# Patient Record
Sex: FEMALE | Race: WHITE | NOT HISPANIC OR LATINO | Employment: UNEMPLOYED | URBAN - NONMETROPOLITAN AREA
[De-identification: names, ages, dates, MRNs, and addresses within clinical notes are randomized per-mention and may not be internally consistent; named-entity substitution may affect disease eponyms.]

---

## 2023-02-04 PROBLEM — B00.1 RECURRENT COLD SORES: Status: ACTIVE | Noted: 2023-02-04

## 2023-02-04 PROBLEM — E66.9 OBESITY: Status: ACTIVE | Noted: 2023-02-04

## 2023-02-04 PROBLEM — F41.9 ANXIETY: Status: ACTIVE | Noted: 2023-02-04

## 2023-02-04 PROBLEM — K59.00 CONSTIPATION: Status: ACTIVE | Noted: 2023-02-04

## 2023-02-04 PROBLEM — D64.9 ANEMIA: Status: ACTIVE | Noted: 2023-02-04

## 2023-02-04 PROBLEM — N92.6 IRREGULAR PERIODS: Status: ACTIVE | Noted: 2023-02-04

## 2023-02-04 PROBLEM — R21 RASH: Status: ACTIVE | Noted: 2023-02-04

## 2023-02-04 PROBLEM — T78.40XA ALLERGIC REACTION: Status: ACTIVE | Noted: 2023-02-04

## 2023-02-04 PROBLEM — B37.9 YEAST INFECTION: Status: ACTIVE | Noted: 2023-02-04

## 2023-02-04 PROBLEM — G43.909 MIGRAINE: Status: ACTIVE | Noted: 2023-02-04

## 2023-02-04 PROBLEM — K64.5 HEMORRHOIDS, THROMBOSED: Status: ACTIVE | Noted: 2023-02-04

## 2023-02-04 PROBLEM — E53.8 B12 DEFICIENCY: Status: ACTIVE | Noted: 2023-02-04

## 2023-02-04 PROBLEM — B00.1 COLD SORE: Status: ACTIVE | Noted: 2023-02-04

## 2023-02-04 RX ORDER — FLUCONAZOLE 150 MG/1
150 TABLET ORAL
COMMUNITY
End: 2023-03-30 | Stop reason: ALTCHOICE

## 2023-02-04 RX ORDER — RIZATRIPTAN BENZOATE 10 MG/1
1 TABLET ORAL
COMMUNITY
End: 2023-05-02 | Stop reason: SDUPTHER

## 2023-02-04 RX ORDER — VALACYCLOVIR HYDROCHLORIDE 1 G/1
1 TABLET, FILM COATED ORAL DAILY
COMMUNITY
End: 2023-03-30 | Stop reason: ALTCHOICE

## 2023-02-04 RX ORDER — NORELGESTROMIN AND ETHINYL ESTRADIOL 35; 150 UG/D; UG/D
1 PATCH TRANSDERMAL
COMMUNITY
End: 2023-03-30 | Stop reason: ALTCHOICE

## 2023-02-04 RX ORDER — HYDROXYZINE HYDROCHLORIDE 25 MG/1
1 TABLET, FILM COATED ORAL AS NEEDED
COMMUNITY
End: 2023-06-06 | Stop reason: SDUPTHER

## 2023-02-04 RX ORDER — VENLAFAXINE HYDROCHLORIDE 75 MG/1
1 CAPSULE, EXTENDED RELEASE ORAL
COMMUNITY
End: 2023-06-06 | Stop reason: SDUPTHER

## 2023-02-04 RX ORDER — MULTIVITAMIN
TABLET ORAL
COMMUNITY

## 2023-02-04 RX ORDER — BUPROPION HYDROCHLORIDE 150 MG/1
1 TABLET ORAL
COMMUNITY
End: 2023-03-30 | Stop reason: ALTCHOICE

## 2023-02-04 RX ORDER — TOPIRAMATE 100 MG/1
1 TABLET, FILM COATED ORAL 2 TIMES DAILY
COMMUNITY
End: 2023-06-06 | Stop reason: SDUPTHER

## 2023-03-30 ENCOUNTER — OFFICE VISIT (OUTPATIENT)
Dept: PRIMARY CARE | Facility: CLINIC | Age: 33
End: 2023-03-30
Payer: COMMERCIAL

## 2023-03-30 VITALS
DIASTOLIC BLOOD PRESSURE: 78 MMHG | SYSTOLIC BLOOD PRESSURE: 123 MMHG | BODY MASS INDEX: 28.45 KG/M2 | HEIGHT: 66 IN | HEART RATE: 85 BPM | WEIGHT: 177 LBS

## 2023-03-30 DIAGNOSIS — E53.8 B12 DEFICIENCY: ICD-10-CM

## 2023-03-30 DIAGNOSIS — M25.559 HIP PAIN: ICD-10-CM

## 2023-03-30 DIAGNOSIS — D50.0 IRON DEFICIENCY ANEMIA DUE TO CHRONIC BLOOD LOSS: ICD-10-CM

## 2023-03-30 LAB
AMPHETAMINE (PRESENCE) IN URINE BY SCREEN METHOD: NORMAL
BARBITURATES PRESENCE IN URINE BY SCREEN METHOD: NORMAL
BENZODIAZEPINE (PRESENCE) IN URINE BY SCREEN METHOD: NORMAL
CANNABINOIDS IN URINE BY SCREEN METHOD: NORMAL
COCAINE (PRESENCE) IN URINE BY SCREEN METHOD: NORMAL
DRUG SCREEN COMMENT URINE: NORMAL
FENTANYL URINE: NORMAL
METHADONE (PRESENCE) IN URINE BY SCREEN METHOD: NORMAL
OPIATES (PRESENCE) IN URINE BY SCREEN METHOD: NORMAL
OXYCODONE (PRESENCE) IN URINE BY SCREEN METHOD: NORMAL
PHENCYCLIDINE (PRESENCE) IN URINE BY SCREEN METHOD: NORMAL

## 2023-03-30 PROCEDURE — 3008F BODY MASS INDEX DOCD: CPT | Performed by: INTERNAL MEDICINE

## 2023-03-30 PROCEDURE — 80307 DRUG TEST PRSMV CHEM ANLYZR: CPT

## 2023-03-30 PROCEDURE — 99214 OFFICE O/P EST MOD 30 MIN: CPT | Performed by: INTERNAL MEDICINE

## 2023-03-30 RX ORDER — PHENTERMINE HYDROCHLORIDE 37.5 MG/1
37.5 TABLET ORAL
Qty: 30 TABLET | Refills: 0 | Status: SHIPPED | OUTPATIENT
Start: 2023-03-30 | End: 2023-05-02 | Stop reason: SDUPTHER

## 2023-03-30 ASSESSMENT — ENCOUNTER SYMPTOMS
CHILLS: 0
HIP PAIN: 1
ABDOMINAL DISTENTION: 0
FATIGUE: 0
SHORTNESS OF BREATH: 0
COUGH: 0
SINUS PAIN: 0
ARTHRALGIAS: 1
WEAKNESS: 0
DIARRHEA: 0
SORE THROAT: 0
WHEEZING: 0
SINUS PRESSURE: 0
APPETITE CHANGE: 0
NAUSEA: 0
VOMITING: 0
ACTIVITY CHANGE: 0
BACK PAIN: 0
NUMBNESS: 0

## 2023-03-30 ASSESSMENT — PATIENT HEALTH QUESTIONNAIRE - PHQ9
SUM OF ALL RESPONSES TO PHQ9 QUESTIONS 1 AND 2: 0
2. FEELING DOWN, DEPRESSED OR HOPELESS: NOT AT ALL
1. LITTLE INTEREST OR PLEASURE IN DOING THINGS: NOT AT ALL

## 2023-03-30 NOTE — ASSESSMENT & PLAN NOTE
- pt has had good success with adipex in the past and has done well to mainatain her weight loss   - currently bmi 28, weight 177 lbs  - agreeable to do another 3 mo  - I have personally reviewed this patient's OARRS report and found it to be appropriate. This has lance uploaded into the medical record. I have considered the risks of abuse, addiction, dependency and diversion and feel that it is clinically appropriate for this patient to be prescribed this controlled substance medication.   - uds ordered

## 2023-03-30 NOTE — PROGRESS NOTES
"Subjective   Patient ID: Sophie Melvin is a 32 y.o. female who presents for Follow-up (6 month) and Hip Pain (Severe pain/She states its been going on for years/She did not want to bring it up since she is not in the pain all the time).  Hip Pain   Pertinent negatives include no numbness.       Patient is here today for 6 mo follow up   Patient reports that she got  since her last appt, everything went well except she was not happy with the caterer.     She has been having hip pain.   She states it has been going on for years.  She has radiating pain from her right buttock radiating down her leg. No numbness or tingling, has to pick her leg up to cross her legs. Cannot sit in the car for more than 30 minutes or she keeps having to readjusting. No injury.     Review of Systems   Constitutional:  Negative for activity change, appetite change, chills and fatigue.   HENT:  Negative for congestion, postnasal drip, sinus pressure, sinus pain and sore throat.    Respiratory:  Negative for cough, shortness of breath and wheezing.    Cardiovascular:  Negative for chest pain and leg swelling.   Gastrointestinal:  Negative for abdominal distention, diarrhea, nausea and vomiting.   Musculoskeletal:  Positive for arthralgias. Negative for back pain.   Neurological:  Negative for weakness and numbness.       Objective   /78 (BP Location: Right arm, Patient Position: Sitting, BP Cuff Size: Adult)   Pulse 85   Ht 1.676 m (5' 6\")   Wt 80.3 kg (177 lb)   BMI 28.57 kg/m²     Physical Exam  Constitutional:       General: She is not in acute distress.     Appearance: Normal appearance.   HENT:      Head: Normocephalic and atraumatic.      Nose: Nose normal.   Eyes:      Extraocular Movements: Extraocular movements intact.      Pupils: Pupils are equal, round, and reactive to light.   Cardiovascular:      Rate and Rhythm: Normal rate and regular rhythm.      Pulses: Normal pulses.      Heart sounds: No murmur " heard.  Pulmonary:      Effort: Pulmonary effort is normal. No respiratory distress.      Breath sounds: Normal breath sounds.   Musculoskeletal:         General: Tenderness present. No swelling.      Comments: Pain with abduction of the right hip in the groin, pain with flexion of the hip   Neurological:      Mental Status: She is alert.       OARRS:  Taylor CARLOS DO Prashanth on 3/30/2023  1:37 PM  I have personally reviewed the OARRS report for Sophie Melvin. I have considered the risks of abuse, dependence, addiction and diversion and I believe that it is clinically appropriate for Sophie Melvin to be prescribed this medication    Is the patient prescribed a combination of a benzodiazepine and opioid?  No    Last Urine Drug Screen / ordered today: YEs  Recent Results (from the past 75942 hour(s))   OPIATE/OPIOID/BENZO PRESCRIPTION COMPLIANCE    Collection Time: 03/02/22  2:20 PM   Result Value Ref Range    DRUG SCREEN COMMENT URINE SEE BELOW     Creatine, Urine 214.9 mg/dL    Amphetamine Screen, Urine PRESUMPTIVE POSITIVE (A) NEGATIVE    Barbiturate Screen, Urine PRESUMPTIVE NEGATIVE NEGATIVE    Cannabinoid Screen, Urine PRESUMPTIVE NEGATIVE NEGATIVE    Cocaine Screen, Urine PRESUMPTIVE NEGATIVE NEGATIVE    PCP Screen, Urine PRESUMPTIVE NEGATIVE NEGATIVE    7-Aminoclonazepam <25 Cutoff <25 ng/mL    Alpha-Hydroxyalprazolam <25 Cutoff <25 ng/mL    Alpha-Hydroxymidazolam <25 Cutoff <25 ng/mL    Alprazolam <25 Cutoff <25 ng/mL    Chlordiazepoxide <25 Cutoff <25 ng/mL    Clonazepam <25 Cutoff <25 ng/mL    Diazepam <25 Cutoff <25 ng/mL    Lorazepam <25 Cutoff <25 ng/mL    Midazolam <25 Cutoff <25 ng/mL    Nordiazepam <25 Cutoff <25 ng/mL    Oxazepam <25 Cutoff <25 ng/mL    Temazepam <25 Cutoff <25 ng/mL    Zolpidem <25 Cutoff <25 ng/mL    Zolpidem Metabolite (ZCA) <25 Cutoff <25 ng/mL    6-Acetylmorphine <25 Cutoff <25 ng/mL    Codeine <50 Cutoff <50 ng/mL    Hydrocodone <25 Cutoff <25 ng/mL    Hydromorphone <25  Cutoff <25 ng/mL    Morphine Urine <50 Cutoff <50 ng/mL    Norhydrocodone <25 Cutoff <25 ng/mL    Noroxycodone <25 Cutoff <25 ng/mL    Oxycodone <25 Cutoff <25 ng/mL    Oxymorphone <25 Cutoff <25 ng/mL    Tramadol <50 Cutoff <50 ng/mL    O-Desmethyltramadol <50 Cutoff <50 ng/mL    Fentanyl <2.5 Cutoff<2.5 ng/mL    Norfentanyl <2.5 Cutoff<2.5 ng/mL    METHADONE CONFIRMATION,URINE <25 Cutoff <25 ng/mL    EDDP <25 Cutoff <25 ng/mL   Amphetamine Confirm, Urine    Collection Time: 03/02/22  2:20 PM   Result Value Ref Range    Amphetamines,Urine <50 ng/mL    MDA, Urine <200 ng/mL    MDEA, Urine <200 ng/mL    MDMA, Urine <200 ng/mL    Methamphetamine Quant, Ur <200 ng/mL    Phentermine,Urine >5000 ng/mL     N/A    Controlled Substance Agreement:  Date of the Last Agreement: 03/30/2023  Reviewed Controlled Substance Agreement including but not limited to the benefits, risks, and alternatives to treatment with a Controlled Substance medication(s).    Anorexiants:   What is the patient's goal of therapy? Weight loss   Is this being achieved with current treatment? yes    I have assessed the patient's continuing efforts to lose weight., I have assessed the patient's dedication to the treatment program and the response to treatment., and I have assessed the presence or absence of contraindications, adverse effects, and indicators of possible substance abuse that would necessitate cessation of treatment utilizing controlled substance.    Patient has demonstrated continued efforts to lose weight, is dedicated to the treatment program and the response to treatment. and I have assessed for the presence or absence of contraindications, adverse effects, and indicators of possible substance abuse that would necessitate cessation of treatment utilizing controlled substance.    Activities of Daily Living:   Is your overall impression that this patient is benefiting (symptom reduction outweighs side effects) from anorexiants therapy? Yes      1. Physical Functioning: Same  2. Family Relationship: Same  3. Social Relationship: Same  4. Mood: Same  5. Sleep Patterns: Same  6. Overall Function: Same      Assessment/Plan   Problem List Items Addressed This Visit          Musculoskeletal    Hip pain    Relevant Orders    XR hip right 2 or 3 views    XR lumbar spine 2-3 views    Referral to Physical Therapy       Endocrine/Metabolic    B12 deficiency    Relevant Orders    Vitamin B12    Body mass index (BMI) of 28.0 to 28.9 in adult - Primary     - pt has had good success with adipex in the past and has done well to mainatain her weight loss   - currently bmi 28, weight 177 lbs  - agreeable to do another 3 mo  - I have personally reviewed this patient's OARRS report and found it to be appropriate. This has lance uploaded into the medical record. I have considered the risks of abuse, addiction, dependency and diversion and feel that it is clinically appropriate for this patient to be prescribed this controlled substance medication.   - will have her do UDS at next appt          Relevant Medications    phentermine (Adipex-P) 37.5 mg tablet    Other Relevant Orders    Drug Screen, Urine With Reflex to Confirmation       Hematologic    Anemia    Relevant Orders    CBC and Auto Differential    Iron and TIBC    Ferritin       Final diagnoses:   [Z68.28] Body mass index (BMI) of 28.0 to 28.9 in adult   [E53.8] B12 deficiency   [D50.0] Iron deficiency anemia due to chronic blood loss   [M25.559] Hip pain

## 2023-04-28 ENCOUNTER — LAB (OUTPATIENT)
Dept: LAB | Facility: LAB | Age: 33
End: 2023-04-28
Payer: COMMERCIAL

## 2023-04-28 DIAGNOSIS — D50.0 IRON DEFICIENCY ANEMIA DUE TO CHRONIC BLOOD LOSS: ICD-10-CM

## 2023-04-28 DIAGNOSIS — E53.8 B12 DEFICIENCY: ICD-10-CM

## 2023-04-28 LAB
BASOPHILS (10*3/UL) IN BLOOD BY AUTOMATED COUNT: 0.06 X10E9/L (ref 0–0.1)
BASOPHILS/100 LEUKOCYTES IN BLOOD BY AUTOMATED COUNT: 0.8 % (ref 0–2)
COBALAMIN (VITAMIN B12) (PG/ML) IN SER/PLAS: 253 PG/ML (ref 211–911)
EOSINOPHILS (10*3/UL) IN BLOOD BY AUTOMATED COUNT: 0.05 X10E9/L (ref 0–0.7)
EOSINOPHILS/100 LEUKOCYTES IN BLOOD BY AUTOMATED COUNT: 0.7 % (ref 0–6)
ERYTHROCYTE DISTRIBUTION WIDTH (RATIO) BY AUTOMATED COUNT: 12.4 % (ref 11.5–14.5)
ERYTHROCYTE MEAN CORPUSCULAR HEMOGLOBIN CONCENTRATION (G/DL) BY AUTOMATED: 32 G/DL (ref 32–36)
ERYTHROCYTE MEAN CORPUSCULAR VOLUME (FL) BY AUTOMATED COUNT: 85 FL (ref 80–100)
ERYTHROCYTES (10*6/UL) IN BLOOD BY AUTOMATED COUNT: 5.41 X10E12/L (ref 4–5.2)
FERRITIN (UG/LL) IN SER/PLAS: 71 UG/L (ref 8–150)
HEMATOCRIT (%) IN BLOOD BY AUTOMATED COUNT: 45.9 % (ref 36–46)
HEMOGLOBIN (G/DL) IN BLOOD: 14.7 G/DL (ref 12–16)
IMMATURE GRANULOCYTES/100 LEUKOCYTES IN BLOOD BY AUTOMATED COUNT: 0.1 % (ref 0–0.9)
IRON (UG/DL) IN SER/PLAS: 111 UG/DL (ref 35–150)
IRON BINDING CAPACITY (UG/DL) IN SER/PLAS: 295 UG/DL (ref 240–445)
IRON SATURATION (%) IN SER/PLAS: 38 % (ref 25–45)
LEUKOCYTES (10*3/UL) IN BLOOD BY AUTOMATED COUNT: 7.5 X10E9/L (ref 4.4–11.3)
LYMPHOCYTES (10*3/UL) IN BLOOD BY AUTOMATED COUNT: 2.76 X10E9/L (ref 1.2–4.8)
LYMPHOCYTES/100 LEUKOCYTES IN BLOOD BY AUTOMATED COUNT: 36.9 % (ref 13–44)
MONOCYTES (10*3/UL) IN BLOOD BY AUTOMATED COUNT: 0.47 X10E9/L (ref 0.1–1)
MONOCYTES/100 LEUKOCYTES IN BLOOD BY AUTOMATED COUNT: 6.3 % (ref 2–10)
NEUTROPHILS (10*3/UL) IN BLOOD BY AUTOMATED COUNT: 4.12 X10E9/L (ref 1.2–7.7)
NEUTROPHILS/100 LEUKOCYTES IN BLOOD BY AUTOMATED COUNT: 55.2 % (ref 40–80)
PLATELETS (10*3/UL) IN BLOOD AUTOMATED COUNT: 317 X10E9/L (ref 150–450)

## 2023-04-28 PROCEDURE — 85025 COMPLETE CBC W/AUTO DIFF WBC: CPT

## 2023-04-28 PROCEDURE — 82728 ASSAY OF FERRITIN: CPT

## 2023-04-28 PROCEDURE — 83540 ASSAY OF IRON: CPT

## 2023-04-28 PROCEDURE — 83550 IRON BINDING TEST: CPT

## 2023-04-28 PROCEDURE — 82607 VITAMIN B-12: CPT

## 2023-04-28 PROCEDURE — 36415 COLL VENOUS BLD VENIPUNCTURE: CPT

## 2023-05-02 ENCOUNTER — PATIENT MESSAGE (OUTPATIENT)
Dept: PRIMARY CARE | Facility: CLINIC | Age: 33
End: 2023-05-02

## 2023-05-02 ENCOUNTER — OFFICE VISIT (OUTPATIENT)
Dept: PRIMARY CARE | Facility: CLINIC | Age: 33
End: 2023-05-02
Payer: COMMERCIAL

## 2023-05-02 VITALS
HEART RATE: 116 BPM | WEIGHT: 173 LBS | DIASTOLIC BLOOD PRESSURE: 80 MMHG | SYSTOLIC BLOOD PRESSURE: 120 MMHG | HEIGHT: 66 IN | BODY MASS INDEX: 27.8 KG/M2

## 2023-05-02 DIAGNOSIS — M25.559 HIP PAIN, UNSPECIFIED LATERALITY: ICD-10-CM

## 2023-05-02 DIAGNOSIS — G43.709 CHRONIC MIGRAINE WITHOUT AURA WITHOUT STATUS MIGRAINOSUS, NOT INTRACTABLE: Primary | ICD-10-CM

## 2023-05-02 PROCEDURE — 99213 OFFICE O/P EST LOW 20 MIN: CPT | Performed by: INTERNAL MEDICINE

## 2023-05-02 PROCEDURE — 3008F BODY MASS INDEX DOCD: CPT | Performed by: INTERNAL MEDICINE

## 2023-05-02 PROCEDURE — 1036F TOBACCO NON-USER: CPT | Performed by: INTERNAL MEDICINE

## 2023-05-02 RX ORDER — PHENTERMINE HYDROCHLORIDE 37.5 MG/1
37.5 TABLET ORAL
Qty: 30 TABLET | Refills: 0 | Status: SHIPPED | OUTPATIENT
Start: 2023-05-02 | End: 2023-06-06 | Stop reason: SDUPTHER

## 2023-05-02 RX ORDER — PHENTERMINE HYDROCHLORIDE 37.5 MG/1
37.5 TABLET ORAL
Qty: 30 TABLET | Refills: 0 | Status: SHIPPED | OUTPATIENT
Start: 2023-05-02 | End: 2023-05-02 | Stop reason: SDUPTHER

## 2023-05-02 RX ORDER — RIZATRIPTAN BENZOATE 10 MG/1
TABLET ORAL
Qty: 9 TABLET | Refills: 3 | Status: SHIPPED | OUTPATIENT
Start: 2023-05-02 | End: 2023-06-06 | Stop reason: SDUPTHER

## 2023-05-02 ASSESSMENT — PATIENT HEALTH QUESTIONNAIRE - PHQ9
1. LITTLE INTEREST OR PLEASURE IN DOING THINGS: NOT AT ALL
SUM OF ALL RESPONSES TO PHQ9 QUESTIONS 1 AND 2: 0
2. FEELING DOWN, DEPRESSED OR HOPELESS: NOT AT ALL

## 2023-05-02 ASSESSMENT — ENCOUNTER SYMPTOMS
SHORTNESS OF BREATH: 0
COUGH: 0

## 2023-05-02 NOTE — PROGRESS NOTES
"Subjective   Patient ID: Sophie Melvin is a 33 y.o. female who presents for Follow-up (1 month).  HPI  Patient is here today for 1 mo follow up on Adipex.  Pt has lost 5 lbs.    Review of Systems   Respiratory:  Negative for cough and shortness of breath.    Cardiovascular:  Negative for chest pain.       Objective   /80 (BP Location: Right arm, Patient Position: Sitting, BP Cuff Size: Adult)   Pulse (!) 116   Ht 1.676 m (5' 6\")   Wt 78.5 kg (173 lb)   BMI 27.92 kg/m²     Physical Exam  Constitutional:       Appearance: Normal appearance.   Neurological:      Mental Status: She is alert.   Psychiatric:         Mood and Affect: Mood normal.         Behavior: Behavior normal.         Thought Content: Thought content normal.         OARRS:  Taylor Alford DO on 3/30/2023  1:37 PM  I have personally reviewed the OARRS report for Sophie Melvin. I have considered the risks of abuse, dependence, addiction and diversion and I believe that it is clinically appropriate for Sophie Melvin to be prescribed this medication     Is the patient prescribed a combination of a benzodiazepine and opioid?  No     Last Urine Drug Screen / ordered today: YEs  Recent Results         Recent Results (from the past 48534 hour(s))   OPIATE/OPIOID/BENZO PRESCRIPTION COMPLIANCE     Collection Time: 03/02/22  2:20 PM   Result Value Ref Range     DRUG SCREEN COMMENT URINE SEE BELOW       Creatine, Urine 214.9 mg/dL     Amphetamine Screen, Urine PRESUMPTIVE POSITIVE (A) NEGATIVE     Barbiturate Screen, Urine PRESUMPTIVE NEGATIVE NEGATIVE     Cannabinoid Screen, Urine PRESUMPTIVE NEGATIVE NEGATIVE     Cocaine Screen, Urine PRESUMPTIVE NEGATIVE NEGATIVE     PCP Screen, Urine PRESUMPTIVE NEGATIVE NEGATIVE     7-Aminoclonazepam <25 Cutoff <25 ng/mL     Alpha-Hydroxyalprazolam <25 Cutoff <25 ng/mL     Alpha-Hydroxymidazolam <25 Cutoff <25 ng/mL     Alprazolam <25 Cutoff <25 ng/mL     Chlordiazepoxide <25 Cutoff <25 ng/mL     " Clonazepam <25 Cutoff <25 ng/mL     Diazepam <25 Cutoff <25 ng/mL     Lorazepam <25 Cutoff <25 ng/mL     Midazolam <25 Cutoff <25 ng/mL     Nordiazepam <25 Cutoff <25 ng/mL     Oxazepam <25 Cutoff <25 ng/mL     Temazepam <25 Cutoff <25 ng/mL     Zolpidem <25 Cutoff <25 ng/mL     Zolpidem Metabolite (ZCA) <25 Cutoff <25 ng/mL     6-Acetylmorphine <25 Cutoff <25 ng/mL     Codeine <50 Cutoff <50 ng/mL     Hydrocodone <25 Cutoff <25 ng/mL     Hydromorphone <25 Cutoff <25 ng/mL     Morphine Urine <50 Cutoff <50 ng/mL     Norhydrocodone <25 Cutoff <25 ng/mL     Noroxycodone <25 Cutoff <25 ng/mL     Oxycodone <25 Cutoff <25 ng/mL     Oxymorphone <25 Cutoff <25 ng/mL     Tramadol <50 Cutoff <50 ng/mL     O-Desmethyltramadol <50 Cutoff <50 ng/mL     Fentanyl <2.5 Cutoff<2.5 ng/mL     Norfentanyl <2.5 Cutoff<2.5 ng/mL     METHADONE CONFIRMATION,URINE <25 Cutoff <25 ng/mL     EDDP <25 Cutoff <25 ng/mL   Amphetamine Confirm, Urine     Collection Time: 03/02/22  2:20 PM   Result Value Ref Range     Amphetamines,Urine <50 ng/mL     MDA, Urine <200 ng/mL     MDEA, Urine <200 ng/mL     MDMA, Urine <200 ng/mL     Methamphetamine Quant, Ur <200 ng/mL     Phentermine,Urine >5000 ng/mL         N/A     Controlled Substance Agreement:  Date of the Last Agreement: 03/30/2023  Reviewed Controlled Substance Agreement including but not limited to the benefits, risks, and alternatives to treatment with a Controlled Substance medication(s).     Anorexiants:   What is the patient's goal of therapy? Weight loss   Is this being achieved with current treatment? yes     I have assessed the patient's continuing efforts to lose weight., I have assessed the patient's dedication to the treatment program and the response to treatment., and I have assessed the presence or absence of contraindications, adverse effects, and indicators of possible substance abuse that would necessitate cessation of treatment utilizing controlled substance.     Patient has  demonstrated continued efforts to lose weight, is dedicated to the treatment program and the response to treatment. and I have assessed for the presence or absence of contraindications, adverse effects, and indicators of possible substance abuse that would necessitate cessation of treatment utilizing controlled substance.     Activities of Daily Living:   Is your overall impression that this patient is benefiting (symptom reduction outweighs side effects) from anorexiants therapy? Yes      1. Physical Functioning: Same  2. Family Relationship: Same  3. Social Relationship: Same  4. Mood: Same  5. Sleep Patterns: Same  6. Overall Function: Same  Assessment/Plan   Problem List Items Addressed This Visit          Musculoskeletal    Hip pain    Relevant Orders    Referral to Physical Therapy       Endocrine/Metabolic    Body mass index (BMI) of 28.0 to 28.9 in adult    Relevant Medications    phentermine (Adipex-P) 37.5 mg tablet       Other    Migraine - Primary    Relevant Medications    rizatriptan (Maxalt) 10 mg tablet       Body mass index (BMI) of 28.0 to 28.9 in adult - Primary        - pt has had good success with adipex in the past and has done well to mainatain her weight loss   - bmi 28, weight 177 lbs down to 173  - sent month #2  - I have personally reviewed this patient's OARRS report and found it to be appropriate. This has lance uploaded into the medical record. I have considered the risks of abuse, addiction, dependency and diversion and feel that it is clinically appropriate for this patient to be prescribed this controlled substance medication.              Hip and back xray normal, recommend pt and if not improving will refer to orthopedics   Final diagnoses:   [Z68.28] Body mass index (BMI) of 28.0 to 28.9 in adult   [G43.709] Chronic migraine without aura without status migrainosus, not intractable   [M25.559] Hip pain, unspecified laterality

## 2023-06-06 ENCOUNTER — OFFICE VISIT (OUTPATIENT)
Dept: PRIMARY CARE | Facility: CLINIC | Age: 33
End: 2023-06-06
Payer: COMMERCIAL

## 2023-06-06 VITALS
HEART RATE: 109 BPM | BODY MASS INDEX: 27.48 KG/M2 | SYSTOLIC BLOOD PRESSURE: 121 MMHG | DIASTOLIC BLOOD PRESSURE: 80 MMHG | WEIGHT: 171 LBS | HEIGHT: 66 IN

## 2023-06-06 DIAGNOSIS — G43.709 CHRONIC MIGRAINE WITHOUT AURA WITHOUT STATUS MIGRAINOSUS, NOT INTRACTABLE: Primary | ICD-10-CM

## 2023-06-06 DIAGNOSIS — F41.9 ANXIETY: Primary | ICD-10-CM

## 2023-06-06 DIAGNOSIS — G43.709 CHRONIC MIGRAINE WITHOUT AURA WITHOUT STATUS MIGRAINOSUS, NOT INTRACTABLE: ICD-10-CM

## 2023-06-06 PROCEDURE — 1036F TOBACCO NON-USER: CPT | Performed by: INTERNAL MEDICINE

## 2023-06-06 PROCEDURE — 80368 SEDATIVE HYPNOTICS: CPT

## 2023-06-06 PROCEDURE — 99213 OFFICE O/P EST LOW 20 MIN: CPT | Performed by: INTERNAL MEDICINE

## 2023-06-06 PROCEDURE — 80365 DRUG SCREENING OXYCODONE: CPT

## 2023-06-06 PROCEDURE — 80346 BENZODIAZEPINES1-12: CPT

## 2023-06-06 PROCEDURE — 80373 DRUG SCREENING TRAMADOL: CPT

## 2023-06-06 PROCEDURE — 3008F BODY MASS INDEX DOCD: CPT | Performed by: INTERNAL MEDICINE

## 2023-06-06 PROCEDURE — 80358 DRUG SCREENING METHADONE: CPT

## 2023-06-06 PROCEDURE — 80307 DRUG TEST PRSMV CHEM ANLYZR: CPT

## 2023-06-06 PROCEDURE — 80354 DRUG SCREENING FENTANYL: CPT

## 2023-06-06 PROCEDURE — 80324 DRUG SCREEN AMPHETAMINES 1/2: CPT

## 2023-06-06 PROCEDURE — 80361 OPIATES 1 OR MORE: CPT

## 2023-06-06 RX ORDER — TOPIRAMATE 100 MG/1
100 TABLET, FILM COATED ORAL 2 TIMES DAILY
Qty: 180 TABLET | Refills: 3 | Status: SHIPPED | OUTPATIENT
Start: 2023-06-06 | End: 2024-06-04

## 2023-06-06 RX ORDER — TOPIRAMATE 100 MG/1
100 TABLET, FILM COATED ORAL 2 TIMES DAILY
Qty: 180 TABLET | Refills: 3 | Status: SHIPPED | OUTPATIENT
Start: 2023-06-06 | End: 2023-06-06 | Stop reason: SDUPTHER

## 2023-06-06 RX ORDER — HYDROXYZINE HYDROCHLORIDE 25 MG/1
25 TABLET, FILM COATED ORAL AS NEEDED
Qty: 90 TABLET | Refills: 3 | Status: SHIPPED | OUTPATIENT
Start: 2023-06-06

## 2023-06-06 RX ORDER — PHENTERMINE HYDROCHLORIDE 37.5 MG/1
37.5 TABLET ORAL
Qty: 30 TABLET | Refills: 0 | Status: SHIPPED | OUTPATIENT
Start: 2023-06-06 | End: 2023-06-06 | Stop reason: SDUPTHER

## 2023-06-06 RX ORDER — PHENTERMINE HYDROCHLORIDE 37.5 MG/1
37.5 TABLET ORAL
Qty: 30 TABLET | Refills: 0 | Status: SHIPPED | OUTPATIENT
Start: 2023-06-06 | End: 2023-07-06

## 2023-06-06 RX ORDER — VENLAFAXINE HYDROCHLORIDE 75 MG/1
75 CAPSULE, EXTENDED RELEASE ORAL
Qty: 90 CAPSULE | Refills: 3 | Status: SHIPPED | OUTPATIENT
Start: 2023-06-06 | End: 2024-06-04

## 2023-06-06 RX ORDER — VENLAFAXINE HYDROCHLORIDE 75 MG/1
75 CAPSULE, EXTENDED RELEASE ORAL
Qty: 90 CAPSULE | Refills: 3 | Status: SHIPPED | OUTPATIENT
Start: 2023-06-06 | End: 2023-06-06 | Stop reason: SDUPTHER

## 2023-06-06 RX ORDER — RIZATRIPTAN BENZOATE 10 MG/1
TABLET ORAL
Qty: 9 TABLET | Refills: 3 | Status: SHIPPED | OUTPATIENT
Start: 2023-06-06 | End: 2023-11-30 | Stop reason: SDUPTHER

## 2023-06-06 ASSESSMENT — ENCOUNTER SYMPTOMS
ACTIVITY CHANGE: 0
ABDOMINAL DISTENTION: 0
FATIGUE: 0
SHORTNESS OF BREATH: 0
NAUSEA: 0
WEAKNESS: 0
CHILLS: 0
DIARRHEA: 0
COUGH: 0
SINUS PRESSURE: 0
SINUS PAIN: 0
SORE THROAT: 0
WHEEZING: 0
BACK PAIN: 0
VOMITING: 0
APPETITE CHANGE: 0
NUMBNESS: 0

## 2023-06-06 NOTE — PROGRESS NOTES
"Subjective   Patient ID: Sophie Bassett is a 33 y.o. female who presents for Follow-up (Month #2 Adipex).  HPI  Patient is here today for 1 mo follow up on Adipex   Pt is down to 171 lbs.  Reports that she was not as active this month due to some hip and back pain.   No side effects with the medication ,tolerating it well.     Review of Systems   Constitutional:  Negative for activity change, appetite change, chills and fatigue.   HENT:  Negative for congestion, postnasal drip, sinus pressure, sinus pain and sore throat.    Respiratory:  Negative for cough, shortness of breath and wheezing.    Cardiovascular:  Negative for chest pain and leg swelling.   Gastrointestinal:  Negative for abdominal distention, diarrhea, nausea and vomiting.   Musculoskeletal:  Negative for back pain.   Neurological:  Negative for weakness and numbness.       Objective   /80 (BP Location: Right arm, Patient Position: Sitting, BP Cuff Size: Adult)   Pulse 109   Ht 1.676 m (5' 6\")   Wt 77.6 kg (171 lb)   BMI 27.60 kg/m²     Physical Exam  Constitutional:       General: She is not in acute distress.     Appearance: Normal appearance.   Neurological:      Mental Status: She is alert.   Psychiatric:         Mood and Affect: Mood normal.         Behavior: Behavior normal.         Thought Content: Thought content normal.         OARRS:  Taylor Alford DO on 3/30/2023  1:37 PM  I have personally reviewed the OARRS report for Sophie Melvin. I have considered the risks of abuse, dependence, addiction and diversion and I believe that it is clinically appropriate for Sophie Melvin to be prescribed this medication     Is the patient prescribed a combination of a benzodiazepine and opioid?  No     Last Urine Drug Screen / ordered today: YEs  Recent Results             Recent Results (from the past 56802 hour(s))   OPIATE/OPIOID/BENZO PRESCRIPTION COMPLIANCE     Collection Time: 03/02/22  2:20 PM   Result Value Ref Range     " DRUG SCREEN COMMENT URINE SEE BELOW       Creatine, Urine 214.9 mg/dL     Amphetamine Screen, Urine PRESUMPTIVE POSITIVE (A) NEGATIVE     Barbiturate Screen, Urine PRESUMPTIVE NEGATIVE NEGATIVE     Cannabinoid Screen, Urine PRESUMPTIVE NEGATIVE NEGATIVE     Cocaine Screen, Urine PRESUMPTIVE NEGATIVE NEGATIVE     PCP Screen, Urine PRESUMPTIVE NEGATIVE NEGATIVE     7-Aminoclonazepam <25 Cutoff <25 ng/mL     Alpha-Hydroxyalprazolam <25 Cutoff <25 ng/mL     Alpha-Hydroxymidazolam <25 Cutoff <25 ng/mL     Alprazolam <25 Cutoff <25 ng/mL     Chlordiazepoxide <25 Cutoff <25 ng/mL     Clonazepam <25 Cutoff <25 ng/mL     Diazepam <25 Cutoff <25 ng/mL     Lorazepam <25 Cutoff <25 ng/mL     Midazolam <25 Cutoff <25 ng/mL     Nordiazepam <25 Cutoff <25 ng/mL     Oxazepam <25 Cutoff <25 ng/mL     Temazepam <25 Cutoff <25 ng/mL     Zolpidem <25 Cutoff <25 ng/mL     Zolpidem Metabolite (ZCA) <25 Cutoff <25 ng/mL     6-Acetylmorphine <25 Cutoff <25 ng/mL     Codeine <50 Cutoff <50 ng/mL     Hydrocodone <25 Cutoff <25 ng/mL     Hydromorphone <25 Cutoff <25 ng/mL     Morphine Urine <50 Cutoff <50 ng/mL     Norhydrocodone <25 Cutoff <25 ng/mL     Noroxycodone <25 Cutoff <25 ng/mL     Oxycodone <25 Cutoff <25 ng/mL     Oxymorphone <25 Cutoff <25 ng/mL     Tramadol <50 Cutoff <50 ng/mL     O-Desmethyltramadol <50 Cutoff <50 ng/mL     Fentanyl <2.5 Cutoff<2.5 ng/mL     Norfentanyl <2.5 Cutoff<2.5 ng/mL     METHADONE CONFIRMATION,URINE <25 Cutoff <25 ng/mL     EDDP <25 Cutoff <25 ng/mL   Amphetamine Confirm, Urine     Collection Time: 03/02/22  2:20 PM   Result Value Ref Range     Amphetamines,Urine <50 ng/mL     MDA, Urine <200 ng/mL     MDEA, Urine <200 ng/mL     MDMA, Urine <200 ng/mL     Methamphetamine Quant, Ur <200 ng/mL     Phentermine,Urine >5000 ng/mL         N/A     Controlled Substance Agreement:  Date of the Last Agreement: 03/30/2023  Reviewed Controlled Substance Agreement including but not limited to the benefits, risks,  and alternatives to treatment with a Controlled Substance medication(s).     Anorexiants:   What is the patient's goal of therapy? Weight loss   Is this being achieved with current treatment? yes     I have assessed the patient's continuing efforts to lose weight., I have assessed the patient's dedication to the treatment program and the response to treatment., and I have assessed the presence or absence of contraindications, adverse effects, and indicators of possible substance abuse that would necessitate cessation of treatment utilizing controlled substance.     Patient has demonstrated continued efforts to lose weight, is dedicated to the treatment program and the response to treatment. and I have assessed for the presence or absence of contraindications, adverse effects, and indicators of possible substance abuse that would necessitate cessation of treatment utilizing controlled substance.     Activities of Daily Living:   Is your overall impression that this patient is benefiting (symptom reduction outweighs side effects) from anorexiants therapy? Yes      1. Physical Functioning: Same  2. Family Relationship: Same  3. Social Relationship: Same  4. Mood: Same  5. Sleep Patterns: Same  6. Overall Function: Same        Assessment/Plan   Problem List Items Addressed This Visit          Endocrine/Metabolic    Body mass index (BMI) of 28.0 to 28.9 in adult    Relevant Medications    venlafaxine XR (Effexor-XR) 75 mg 24 hr capsule    phentermine (Adipex-P) 37.5 mg tablet       Other    Migraine - Primary    Relevant Medications    topiramate (Topamax) 100 mg tablet     Obesity, BMI 27  - pt has had good success with adipex in the past and has done well to mainatain her weight loss   - bmi 27.6, weight 177 lbs down to 173 to 171  - sent month #3  - I have personally reviewed this patient's OARRS report and found it to be appropriate. This has lance uploaded into the medical record. I have considered the risks of abuse,  addiction, dependency and diversion and feel that it is clinically appropriate for this patient to be prescribed this controlled substance medication.     Refill topamax   Final diagnoses:   [Z68.28] Body mass index (BMI) of 28.0 to 28.9 in adult   [G43.709] Chronic migraine without aura without status migrainosus, not intractable

## 2023-06-09 LAB
6-ACETYLMORPHINE: <25 NG/ML
7-AMINOCLONAZEPAM: <25 NG/ML
ALPHA-HYDROXYALPRAZOLAM: <25 NG/ML
ALPHA-HYDROXYMIDAZOLAM: <25 NG/ML
ALPRAZOLAM: <25 NG/ML
AMPHETAMINE (PRESENCE) IN URINE BY SCREEN METHOD: ABNORMAL
BARBITURATES PRESENCE IN URINE BY SCREEN METHOD: ABNORMAL
CANNABINOIDS IN URINE BY SCREEN METHOD: ABNORMAL
CHLORDIAZEPOXIDE: <25 NG/ML
CLONAZEPAM: <25 NG/ML
COCAINE (PRESENCE) IN URINE BY SCREEN METHOD: ABNORMAL
CODEINE: <50 NG/ML
CREATINE, URINE FOR DRUG: 102 MG/DL
DIAZEPAM: <25 NG/ML
DRUG SCREEN COMMENT URINE: ABNORMAL
EDDP: <25 NG/ML
FENTANYL CONFIRMATION, URINE: <2.5 NG/ML
HYDROCODONE: <25 NG/ML
HYDROMORPHONE: <25 NG/ML
LORAZEPAM: <25 NG/ML
METHADONE CONFIRMATION,URINE: <25 NG/ML
MIDAZOLAM: <25 NG/ML
MORPHINE URINE: <50 NG/ML
NORDIAZEPAM: <25 NG/ML
NORFENTANYL: <2.5 NG/ML
NORHYDROCODONE: <25 NG/ML
NOROXYCODONE: <25 NG/ML
O-DESMETHYLTRAMADOL: <50 NG/ML
OXAZEPAM: <25 NG/ML
OXYCODONE: <25 NG/ML
OXYMORPHONE: <25 NG/ML
PHENCYCLIDINE (PRESENCE) IN URINE BY SCREEN METHOD: ABNORMAL
TEMAZEPAM: <25 NG/ML
TRAMADOL: <50 NG/ML
ZOLPIDEM METABOLITE (ZCA): <25 NG/ML
ZOLPIDEM: <25 NG/ML

## 2023-06-10 LAB
AMPHETAMINES,URINE: <50 NG/ML
MDA,URINE: <200 NG/ML
MDEA,URINE: <200 NG/ML
MDMA,UR: <200 NG/ML
METHAMPHETAMINE QUANTITATIVE URINE: <200 NG/ML
PHENTERMINE,UR: >5000 NG/ML

## 2023-07-18 ENCOUNTER — APPOINTMENT (OUTPATIENT)
Dept: PRIMARY CARE | Facility: CLINIC | Age: 33
End: 2023-07-18
Payer: COMMERCIAL

## 2023-08-02 ENCOUNTER — OFFICE VISIT (OUTPATIENT)
Dept: PRIMARY CARE | Facility: CLINIC | Age: 33
End: 2023-08-02
Payer: COMMERCIAL

## 2023-08-02 VITALS
HEART RATE: 109 BPM | WEIGHT: 174 LBS | BODY MASS INDEX: 27.97 KG/M2 | SYSTOLIC BLOOD PRESSURE: 127 MMHG | DIASTOLIC BLOOD PRESSURE: 83 MMHG | HEIGHT: 66 IN

## 2023-08-02 DIAGNOSIS — M25.551 BILATERAL HIP PAIN: ICD-10-CM

## 2023-08-02 DIAGNOSIS — G43.709 CHRONIC MIGRAINE WITHOUT AURA WITHOUT STATUS MIGRAINOSUS, NOT INTRACTABLE: ICD-10-CM

## 2023-08-02 DIAGNOSIS — F41.9 ANXIETY: Primary | ICD-10-CM

## 2023-08-02 DIAGNOSIS — M25.552 BILATERAL HIP PAIN: ICD-10-CM

## 2023-08-02 PROCEDURE — 99213 OFFICE O/P EST LOW 20 MIN: CPT | Performed by: INTERNAL MEDICINE

## 2023-08-02 PROCEDURE — 3008F BODY MASS INDEX DOCD: CPT | Performed by: INTERNAL MEDICINE

## 2023-08-02 PROCEDURE — 1036F TOBACCO NON-USER: CPT | Performed by: INTERNAL MEDICINE

## 2023-08-02 ASSESSMENT — ENCOUNTER SYMPTOMS
VOMITING: 0
NAUSEA: 0
CHILLS: 0
DIARRHEA: 0
APPETITE CHANGE: 0
WEAKNESS: 0
SHORTNESS OF BREATH: 0
NUMBNESS: 0
COUGH: 0
WHEEZING: 0
SINUS PAIN: 0
FATIGUE: 0
SINUS PRESSURE: 0
SORE THROAT: 0
ABDOMINAL DISTENTION: 0
BACK PAIN: 0
ACTIVITY CHANGE: 0

## 2023-08-02 NOTE — PROGRESS NOTES
"Subjective   Patient ID: Sophie Bassett is a 33 y.o. female who presents for Follow-up (6 week).  HPI  Patient is here today for 6 mo follow up      Review of Systems   Constitutional:  Negative for activity change, appetite change, chills and fatigue.   HENT:  Negative for congestion, postnasal drip, sinus pressure, sinus pain and sore throat.    Respiratory:  Negative for cough, shortness of breath and wheezing.    Cardiovascular:  Negative for chest pain and leg swelling.   Gastrointestinal:  Negative for abdominal distention, diarrhea, nausea and vomiting.   Musculoskeletal:  Negative for back pain.   Neurological:  Negative for weakness and numbness.       Objective   /83 (BP Location: Right arm, Patient Position: Sitting, BP Cuff Size: Adult)   Pulse 109   Ht 1.676 m (5' 6\")   Wt 78.9 kg (174 lb)   BMI 28.08 kg/m²     Physical Exam  Constitutional:       General: She is not in acute distress.     Appearance: Normal appearance.   Musculoskeletal:         General: No swelling.   Skin:     General: Skin is warm and dry.      Coloration: Skin is not jaundiced.   Neurological:      General: No focal deficit present.      Mental Status: She is alert and oriented to person, place, and time.      Cranial Nerves: No cranial nerve deficit.   Psychiatric:         Mood and Affect: Mood normal.         Behavior: Behavior normal.           Assessment/Plan   Problem List Items Addressed This Visit       Anxiety - Primary    Migraine    Hip pain    Relevant Orders    Referral to Orthopaedic Surgery     Migraines   - continue rizaptriptan prn   - worse with periods     2. Obesity, weight stable 174, bmi 28     3. Anxiety   - continue vistaril prn   - continue effexor xr po daily     4. Hip pain   - finished pt without much improvement   - will refer to Ortho for eval       Final diagnoses:   [F41.9] Anxiety   [M25.551, M25.552] Bilateral hip pain   [G43.709] Chronic migraine without aura without status " migrainosus, not intractable

## 2023-10-03 ENCOUNTER — APPOINTMENT (OUTPATIENT)
Dept: PRIMARY CARE | Facility: CLINIC | Age: 33
End: 2023-10-03
Payer: COMMERCIAL

## 2023-11-30 DIAGNOSIS — G43.709 CHRONIC MIGRAINE WITHOUT AURA WITHOUT STATUS MIGRAINOSUS, NOT INTRACTABLE: ICD-10-CM

## 2023-11-30 RX ORDER — RIZATRIPTAN BENZOATE 10 MG/1
TABLET ORAL
Qty: 9 TABLET | Refills: 3 | Status: SHIPPED | OUTPATIENT
Start: 2023-11-30 | End: 2024-01-09 | Stop reason: SDUPTHER

## 2024-01-08 ENCOUNTER — PATIENT MESSAGE (OUTPATIENT)
Dept: PRIMARY CARE | Facility: CLINIC | Age: 34
End: 2024-01-08
Payer: COMMERCIAL

## 2024-01-08 DIAGNOSIS — G43.709 CHRONIC MIGRAINE WITHOUT AURA WITHOUT STATUS MIGRAINOSUS, NOT INTRACTABLE: ICD-10-CM

## 2024-01-09 RX ORDER — RIZATRIPTAN BENZOATE 10 MG/1
TABLET ORAL
Qty: 9 TABLET | Refills: 3 | Status: SHIPPED | OUTPATIENT
Start: 2024-01-09

## 2024-03-17 ENCOUNTER — PATIENT MESSAGE (OUTPATIENT)
Dept: PRIMARY CARE | Facility: CLINIC | Age: 34
End: 2024-03-17
Payer: COMMERCIAL

## 2024-03-17 DIAGNOSIS — K64.5 HEMORRHOIDS, THROMBOSED: Primary | ICD-10-CM

## 2024-04-25 ENCOUNTER — APPOINTMENT (OUTPATIENT)
Dept: SURGERY | Facility: CLINIC | Age: 34
End: 2024-04-25
Payer: COMMERCIAL

## 2024-05-01 DIAGNOSIS — R21 RASH: Primary | ICD-10-CM

## 2024-05-01 RX ORDER — ERYTHROMYCIN 20 MG/ML
SOLUTION TOPICAL 2 TIMES DAILY
Qty: 60 ML | Refills: 2 | Status: SHIPPED | OUTPATIENT
Start: 2024-05-01 | End: 2024-08-29

## 2024-05-02 ENCOUNTER — LAB (OUTPATIENT)
Dept: LAB | Facility: LAB | Age: 34
End: 2024-05-02
Payer: COMMERCIAL

## 2024-05-02 DIAGNOSIS — R21 RASH: ICD-10-CM

## 2024-05-02 PROCEDURE — 86038 ANTINUCLEAR ANTIBODIES: CPT

## 2024-05-02 PROCEDURE — 36415 COLL VENOUS BLD VENIPUNCTURE: CPT

## 2024-05-10 LAB — ANA SER QL HEP2 SUBST: NEGATIVE

## 2024-06-04 DIAGNOSIS — G43.709 CHRONIC MIGRAINE WITHOUT AURA WITHOUT STATUS MIGRAINOSUS, NOT INTRACTABLE: ICD-10-CM

## 2024-06-04 RX ORDER — VENLAFAXINE HYDROCHLORIDE 75 MG/1
75 CAPSULE, EXTENDED RELEASE ORAL DAILY
Qty: 90 CAPSULE | Refills: 0 | Status: SHIPPED | OUTPATIENT
Start: 2024-06-04

## 2024-06-04 RX ORDER — TOPIRAMATE 100 MG/1
100 TABLET, FILM COATED ORAL 2 TIMES DAILY
Qty: 180 TABLET | Refills: 0 | Status: SHIPPED | OUTPATIENT
Start: 2024-06-04

## 2024-08-29 ENCOUNTER — OFFICE VISIT (OUTPATIENT)
Dept: URGENT CARE | Facility: CLINIC | Age: 34
End: 2024-08-29
Payer: COMMERCIAL

## 2024-08-29 VITALS
OXYGEN SATURATION: 99 % | RESPIRATION RATE: 16 BRPM | DIASTOLIC BLOOD PRESSURE: 76 MMHG | BODY MASS INDEX: 28.72 KG/M2 | TEMPERATURE: 97.6 F | WEIGHT: 183 LBS | SYSTOLIC BLOOD PRESSURE: 131 MMHG | HEART RATE: 92 BPM | HEIGHT: 67 IN

## 2024-08-29 DIAGNOSIS — N76.0 ACUTE VAGINITIS: Primary | ICD-10-CM

## 2024-08-29 LAB
POC APPEARANCE, URINE: CLEAR
POC BILIRUBIN, URINE: NEGATIVE
POC BLOOD, URINE: NEGATIVE
POC COLOR, URINE: YELLOW
POC GLUCOSE, URINE: NEGATIVE MG/DL
POC KETONES, URINE: NEGATIVE MG/DL
POC LEUKOCYTES, URINE: ABNORMAL
POC NITRITE,URINE: NEGATIVE
POC PH, URINE: 6 PH
POC PROTEIN, URINE: NEGATIVE MG/DL
POC SPECIFIC GRAVITY, URINE: 1.01
POC UROBILINOGEN, URINE: 1 EU/DL

## 2024-08-29 PROCEDURE — 87086 URINE CULTURE/COLONY COUNT: CPT

## 2024-08-29 PROCEDURE — 99213 OFFICE O/P EST LOW 20 MIN: CPT | Performed by: NURSE PRACTITIONER

## 2024-08-29 PROCEDURE — 81002 URINALYSIS NONAUTO W/O SCOPE: CPT | Performed by: NURSE PRACTITIONER

## 2024-08-29 RX ORDER — MICONAZOLE NITRATE 1200MG-2%
KIT VAGINAL ONCE
Qty: 1 KIT | Refills: 0 | Status: SHIPPED | OUTPATIENT
Start: 2024-08-29 | End: 2024-08-29

## 2024-08-29 NOTE — PROGRESS NOTES
34 y.o. female presents for evaluation of vaginal itching, external vaginal burning and urinary urgency for the past 3 days.  States she is 23 weeks pregnant. States she recently went swimming and fell asleep in wet bathing suit. Denies any abdominal pain, vaginal bleeding or abnormal discharge, decrease in fetal movement, nausea, vomiting, body aches, fatigue, fever or any other associated symptom or complaint.  Denies any OTC remedies used.  No other complaints.      Vitals:    24 1520   BP: 131/76   Pulse: 92   Resp: 16   Temp: 36.4 °C (97.6 °F)   SpO2: 99%       Allergies   Allergen Reactions    Ciprofloxacin Unknown       Medication Documentation Review Audit       Reviewed by Norah Pham MA (Medical Assistant) on 24 at 1519      Medication Order Taking? Sig Documenting Provider Last Dose Status   erythromycin with Ethanol (Theramycin) 2 % external solution [id number] Yes Apply topically 2 times a day. apply to affected area Taylor Alford DO Taking Active   hydrOXYzine HCL (Atarax) 25 mg tablet [id number] No Take 1 tablet (25 mg) by mouth if needed for anxiety (Every 4-6 hours).   Patient not taking: Reported on 2024    Taylor Alford DO Not Taking Active   multivitamin tablet [id number] Yes Take by mouth. Historical Provider, MD Taking Active   phentermine (Adipex-P) 37.5 mg tablet [id number]  Take 1 tablet (37.5 mg) by mouth once daily in the morning. Take before meals. Taylor Alford DO   23 2359   rimegepant (Nurtec ODT) 75 mg tablet,disintegrating [id number] No Take 1 tablet (75 mg) by mouth if needed (Not to exceed 2 in 24 hours). Historical Provider, MD Not Taking Active   rizatriptan (Maxalt) 10 mg tablet [id number] No Take at onset of headache. May repeat every 2 hours as needed. Maximum 3 tablets/ 24 hours.   Patient not taking: Reported on 2024    Taylor Alford DO Not Taking Active   topiramate (Topamax) 100 mg tablet [id number] No Take 1 tablet by mouth  twice daily   Patient not taking: Reported on 8/29/2024    Taylor Alford, DO Not Taking Active   venlafaxine XR (Effexor-XR) 75 mg 24 hr capsule [id number] No Take 1 capsule by mouth once daily   Patient not taking: Reported on 8/29/2024    Taylor Alford, DO Not Taking Active                    Past Medical History:   Diagnosis Date    Encounter for gynecological examination (general) (routine) without abnormal findings     Pap test, as part of routine gynecological examination    Other conditions influencing health status     History of vaginal delivery    Other conditions influencing health status     Menarche       Past Surgical History:   Procedure Laterality Date    OTHER SURGICAL HISTORY  07/09/2020    Cholecystectomy       ROS  See HPI    Physical Exam  Vitals and nursing note reviewed.   Constitutional:       Appearance: Normal appearance.   Abdominal:      Tenderness: There is no right CVA tenderness or left CVA tenderness.   Genitourinary:     Vagina: No vaginal discharge.      Rectum: Normal.      Comments: Pt reports vaginal itching and bilateral labia irritation  Neurological:      General: No focal deficit present.      Mental Status: She is alert and oriented to person, place, and time.   Psychiatric:         Mood and Affect: Mood normal.         Behavior: Behavior normal.       Recent Results (from the past 24 hour(s))   POCT UA (nonautomated w/o microscopy) manually resulted    Collection Time: 08/29/24  3:55 PM   Result Value Ref Range    POC Color, Urine Yellow Straw, Yellow, Light-Yellow    POC Appearance, Urine Clear Clear    POC Glucose, Urine NEGATIVE NEGATIVE mg/dl    POC Bilirubin, Urine NEGATIVE NEGATIVE    POC Ketones, Urine NEGATIVE NEGATIVE mg/dl    POC Specific Gravity, Urine 1.015 1.005 - 1.035    POC Blood, Urine NEGATIVE NEGATIVE    POC PH, Urine 6.0 No Reference Range Established PH    POC Protein, Urine NEGATIVE NEGATIVE, 30 (1+) mg/dl    POC Urobilinogen, Urine 1.0 0.2,  1.0 EU/DL    Poc Nitrite, Urine NEGATIVE NEGATIVE    POC Leukocytes, Urine SMALL (1+) (A) NEGATIVE       Assessment/Plan/MDM  Sophie was seen today for uti.  Diagnoses and all orders for this visit:  Acute vaginitis (Primary)  -     miconazole (Monistat 1 Combo Pack) kit; Insert into the vagina 1 time for 1 dose.  -     POCT UA (nonautomated w/o microscopy) manually resulted  -     Urine Culture    Will sent urine for culture and treat as vaginal candida with monistat. Will begin oral antibiotics if urine culture has growth.  Patient's clinical presentation is otherwise unremarkable at this time. Patient is discharged with instructions to follow-up with primary care or seek emergency medical attention for worsening symptoms or any new concerns.      I did personally review Sophie's past medical history, surgical history, social history, as well as family history (when relevant).  In this case, I also oversaw the her drug management by reviewing her medication list, allergy list, as well as the medications that I prescribed during the UC course and/or recommended as an out-patient (including possible OTC medications such as acetaminophen, NSAIDs , etc).    After reviewing the items above, I did look at previous medical documentation, such as recent hospitalizations, office visits, and/or recent consultations with PCP/specialist.                          SDOH:   Another factor that I considered in Sophie's care was her Social Determinants of Health (SDOH). During this UC encounter, she did not have social determinants of health. Those SDOH influencing Sophie's care are: none      Gerardo Elias CNP  [facility]  [phone]

## 2024-08-30 LAB — BACTERIA UR CULT: ABNORMAL

## 2024-09-03 ENCOUNTER — TELEPHONE (OUTPATIENT)
Dept: URGENT CARE | Facility: CLINIC | Age: 34
End: 2024-09-03
Payer: COMMERCIAL

## 2024-09-03 NOTE — TELEPHONE ENCOUNTER
Result Communication    Resulted Orders   POCT UA (nonautomated w/o microscopy) manually resulted   Result Value Ref Range    POC Color, Urine Yellow Straw, Yellow, Light-Yellow    POC Appearance, Urine Clear Clear    POC Glucose, Urine NEGATIVE NEGATIVE mg/dl    POC Bilirubin, Urine NEGATIVE NEGATIVE    POC Ketones, Urine NEGATIVE NEGATIVE mg/dl    POC Specific Gravity, Urine 1.015 1.005 - 1.035    POC Blood, Urine NEGATIVE NEGATIVE    POC PH, Urine 6.0 No Reference Range Established PH    POC Protein, Urine NEGATIVE NEGATIVE, 30 (1+) mg/dl    POC Urobilinogen, Urine 1.0 0.2, 1.0 EU/DL    Poc Nitrite, Urine NEGATIVE NEGATIVE    POC Leukocytes, Urine SMALL (1+) (A) NEGATIVE   Urine Culture   Result Value Ref Range    Urine Culture (A)      Multiple organisms present, probable contamination. Repeat culture if clinically indicated.       10:01 AM      Results were successfully communicated with the patient and they acknowledged their understanding.

## 2024-12-26 ENCOUNTER — PATIENT MESSAGE (OUTPATIENT)
Dept: PRIMARY CARE | Facility: CLINIC | Age: 34
End: 2024-12-26
Payer: COMMERCIAL

## 2024-12-26 DIAGNOSIS — G43.709 CHRONIC MIGRAINE WITHOUT AURA WITHOUT STATUS MIGRAINOSUS, NOT INTRACTABLE: ICD-10-CM

## 2024-12-30 RX ORDER — RIZATRIPTAN BENZOATE 10 MG/1
TABLET ORAL
Qty: 9 TABLET | Refills: 3 | Status: SHIPPED | OUTPATIENT
Start: 2024-12-30

## 2025-01-30 DIAGNOSIS — K64.9 HEMORRHOIDS, UNSPECIFIED HEMORRHOID TYPE: Primary | ICD-10-CM

## 2025-02-10 ENCOUNTER — APPOINTMENT (OUTPATIENT)
Dept: SURGERY | Facility: CLINIC | Age: 35
End: 2025-02-10
Payer: COMMERCIAL

## 2025-02-10 VITALS
SYSTOLIC BLOOD PRESSURE: 120 MMHG | BODY MASS INDEX: 27.03 KG/M2 | HEIGHT: 67 IN | DIASTOLIC BLOOD PRESSURE: 80 MMHG | WEIGHT: 172.2 LBS

## 2025-02-10 DIAGNOSIS — K64.9 HEMORRHOIDS, UNSPECIFIED HEMORRHOID TYPE: ICD-10-CM

## 2025-02-10 PROCEDURE — 1036F TOBACCO NON-USER: CPT | Performed by: SURGERY

## 2025-02-10 PROCEDURE — 99243 OFF/OP CNSLTJ NEW/EST LOW 30: CPT | Performed by: SURGERY

## 2025-02-10 PROCEDURE — 46600 DIAGNOSTIC ANOSCOPY SPX: CPT | Performed by: SURGERY

## 2025-02-10 NOTE — PROGRESS NOTES
General Surgery Consultation    Patient: Sophie Bassett  : 1990  MRN: [id number]  Date of Consultation: 02/10/25    Referring Primary Care Provider: Taylor Alford DO    Chief Complaint: BRBPR    History of Present Illness: Sophie Bassett is a 34 y.o. old female seen at the request of Dr. Alford for evaluation of hemorrhoids.  Her issue with hemorrhoids began in  at time of her first pregnancy.  She had 2 subsequent pregnancies.  Her first 2 pregnancies were vaginal deliveries.  Most recently, she had a  on 24.  She has chronic constipation.  She would typically have a bowel movement once every 7 to 10 days.  During her most recent pregnancy, she was placed on a daily Dulcolax tablet.  With this, she is having bowel movements once every 3 days.  They are soft but formed.  About twice per year she will have episodes where she has a significant amount of bright red blood per rectum that drips into the toilet bowl.  Separate from these episodes of painless bleeding, about once every 3 to 4 months she will have perianal pain and swelling.  When that occurs, she also has bright red blood with every bowel movement.  This will last for several consecutive days and then eventually ease off.  When this happens, she feels a small firm lump at the perianal skin. She is not on any blood thinners or antiplatelet agents.  Her paternal grandfather had colon cancer diagnosed over the age of 60.  She has no other family history of colon or rectal cancer or inflammatory bowel disease.  She has never had a colonoscopy or procedures performed for hemorrhoids.    Medical History:  BRBPR  Anxiety    Surgical History:  , 2024   Cholecystectomy, 2020    Home Medications:  Prior to Admission medications    Medication Sig Start Date End Date Taking? Authorizing Provider   hydrOXYzine HCL (Atarax) 25 mg tablet Take 1 tablet (25 mg) by mouth if needed for anxiety (Every 4-6  "hours).  Patient not taking: Reported on 8/29/2024 6/6/23   Taylor Alford DO   multivitamin tablet Take by mouth.    Historical Provider, MD   phentermine (Adipex-P) 37.5 mg tablet Take 1 tablet (37.5 mg) by mouth once daily in the morning. Take before meals. 6/6/23 7/6/23  Taylor Alford DO   rimegepant (Nurtec ODT) 75 mg tablet,disintegrating Take 1 tablet (75 mg) by mouth if needed (Not to exceed 2 in 24 hours).    Historical Provider, MD   rizatriptan (Maxalt) 10 mg tablet Take at onset of headache. May repeat every 2 hours as needed. Maximum 3 tablets/ 24 hours. 12/30/24   Taylor Alford DO   topiramate (Topamax) 100 mg tablet Take 1 tablet by mouth twice daily  Patient not taking: Reported on 8/29/2024 6/4/24   Taylor Alford DO   venlafaxine XR (Effexor-XR) 75 mg 24 hr capsule Take 1 capsule by mouth once daily  Patient not taking: Reported on 8/29/2024 6/4/24   Taylor Alford DO     Allergies:  Allergies   Allergen Reactions    Ciprofloxacin Other     THROAT SWELLING     Family History:   Paternal grandfather with colon cancer, diagnosed over the age of 60.    Social History:  Vapes.  No alcohol or drug use.    ROS:  Constitutional:  no fever, sweats, and chills  Cardiovascular: No chest pain  Respiratory: No cough or shortness of breath  Gastrointestinal: + BRBPR, perianal pain and swelling, chronic constipation  Genitourinary: no dysuria  Musculoskeletal: no weakness or swelling  Integumentary: no rashes  Neurological: no confusion  Endocrine: no heat or cold intolerance  Heme/Lymph: no easy bruising or bleeding    Objective:  /80   Ht 1.702 m (5' 7\")   Wt 78.1 kg (172 lb 3.2 oz)   BMI 26.97 kg/m²     Physical Exam:  Constitutional: No acute distress, conversant, pleasant  Neurologic: alert and oriented  Psych: appropriate affect  Ears, Nose, Mouth and Throat: mucus membranes moist  Pulmonary: No labored breathing  Cardiovascular: Regular rate and rhythm  Abdomen: " non-distended, BMI 29  Rectal: She has a large external hemorrhoidal skin tag in the right posterior location.  She has a moderate-sized hemorrhoidal skin tag in the right anterior location, and a smaller skin tag in the left lateral location.  These were not inflamed on today's examination.  She has no palpable masses on digital rectal exam and has normal tone.  Musculoskeletal: Moves all extremities, no edema  Skin: no jaundice    Procedure:  Lighted anoscopy was performed in the prone jackknife position with the assistance of my medical assistant, Lamont.  This revealed moderately enlarged internal hemorrhoids.  These appeared amenable to banding.  They were not friable and did not bleed on contact with the scope.    Labs/Imaging:   No recent/pertinent labs or imaging available for review.    Assessment and Plan: Sophie Bassett is a 34 y.o. old female with enlarged internal and external hemorrhoids, as well as blood per rectum.  We discussed that the most likely source would be from hemorrhoids.  However, I cannot definitively say that the bleeding is not from a more proximal source and therefore I recommend diagnostic colonoscopy.  She is in agreement with needing a colonoscopy.  However, she is currently breast-feeding and is an under- of breastmilk.  She would prefer to wait until she is done breast-feeding before going through with the colonoscopy.  I think that is very reasonable.  She will continue the Dulcolax, and could even consider increasing this to twice daily.  With her chronic constipation being better controlled and her pregnancies over with, it is possible that she sees adequate benefit with these first-line treatments that she may not need further intervention.  From her description, it sounds like she has also had thrombosed hemorrhoids in the past.  We discussed that if that were to occur again, to call the office and I can fit her in sooner as there is benefit within the first 72  hours of incising and draining them.  She is going to call my office once she is done breast-feeding.  At that point, we will get her scheduled for colonoscopy and reevaluate how she is doing with the Dulcolax and bowel movements.  If needed, we could then discuss either excisional hemorrhoidectomy versus hemorrhoid banding.    Taylor Hutchins MD  2/10/2025

## 2025-02-10 NOTE — LETTER
February 10, 2025     Taylor Alford DO  [address]  [facility] [address]    Patient: Sophie Bassett   YOB: 1990   Date of Visit: 2/10/2025       Dear Dr. Taylor Alford DO:    Thank you for referring Sophie Bassett to me for evaluation. Below are my notes for this consultation.  If you have questions, please do not hesitate to call me. I look forward to following your patient along with you.       Sincerely,     Taylor Hutchins MD      CC: No Recipients  ______________________________________________________________________________________    General Surgery Consultation    Patient: Sophie Bassett  : 1990  MRN: [id number]  Date of Consultation: 02/10/25    Referring Primary Care Provider: Taylor Alfodr DO    Chief Complaint: BRBPR    History of Present Illness: Sophie Bassett is a 34 y.o. old female seen at the request of Dr. Alford for evaluation of hemorrhoids.  Her issue with hemorrhoids began in  at time of her first pregnancy.  She had 2 subsequent pregnancies.  Her first 2 pregnancies were vaginal deliveries.  Most recently, she had a  on 24.  She has chronic constipation.  She would typically have a bowel movement once every 7 to 10 days.  During her most recent pregnancy, she was placed on a daily Dulcolax tablet.  With this, she is having bowel movements once every 3 days.  They are soft but formed.  About twice per year she will have episodes where she has a significant amount of bright red blood per rectum that drips into the toilet bowl.  Separate from these episodes of painless bleeding, about once every 3 to 4 months she will have perianal pain and swelling.  When that occurs, she also has bright red blood with every bowel movement.  This will last for several consecutive days and then eventually ease off.  When this happens, she feels a small firm lump at the perianal skin. She is not on any blood  thinners or antiplatelet agents.  Her paternal grandfather had colon cancer diagnosed over the age of 60.  She has no other family history of colon or rectal cancer or inflammatory bowel disease.  She has never had a colonoscopy or procedures performed for hemorrhoids.    Medical History:  BRBPR  Anxiety    Surgical History:  , 2024   Cholecystectomy, 2020    Home Medications:  Prior to Admission medications    Medication Sig Start Date End Date Taking? Authorizing Provider   hydrOXYzine HCL (Atarax) 25 mg tablet Take 1 tablet (25 mg) by mouth if needed for anxiety (Every 4-6 hours).  Patient not taking: Reported on 2024   Taylor Alford DO   multivitamin tablet Take by mouth.    Historical Provider, MD   phentermine (Adipex-P) 37.5 mg tablet Take 1 tablet (37.5 mg) by mouth once daily in the morning. Take before meals. 23  Taylor Alford DO   rimegepant (Nurtec ODT) 75 mg tablet,disintegrating Take 1 tablet (75 mg) by mouth if needed (Not to exceed 2 in 24 hours).    Historical Provider, MD   rizatriptan (Maxalt) 10 mg tablet Take at onset of headache. May repeat every 2 hours as needed. Maximum 3 tablets/ 24 hours. 24   Taylor Alford DO   topiramate (Topamax) 100 mg tablet Take 1 tablet by mouth twice daily  Patient not taking: Reported on 2024   Taylor Alford DO   venlafaxine XR (Effexor-XR) 75 mg 24 hr capsule Take 1 capsule by mouth once daily  Patient not taking: Reported on 2024   Taylor Alford DO     Allergies:  Allergies   Allergen Reactions   • Ciprofloxacin Other     THROAT SWELLING     Family History:   Paternal grandfather with colon cancer, diagnosed over the age of 60.    Social History:  Vapes.  No alcohol or drug use.    ROS:  Constitutional:  no fever, sweats, and chills  Cardiovascular: No chest pain  Respiratory: No cough or shortness of breath  Gastrointestinal: + BRBPR, perianal pain and  "swelling, chronic constipation  Genitourinary: no dysuria  Musculoskeletal: no weakness or swelling  Integumentary: no rashes  Neurological: no confusion  Endocrine: no heat or cold intolerance  Heme/Lymph: no easy bruising or bleeding    Objective:  /80   Ht 1.702 m (5' 7\")   Wt 78.1 kg (172 lb 3.2 oz)   BMI 26.97 kg/m²     Physical Exam:  Constitutional: No acute distress, conversant, pleasant  Neurologic: alert and oriented  Psych: appropriate affect  Ears, Nose, Mouth and Throat: mucus membranes moist  Pulmonary: No labored breathing  Cardiovascular: Regular rate and rhythm  Abdomen: non-distended, BMI 29  Rectal: She has a large external hemorrhoidal skin tag in the right posterior location.  She has a moderate-sized hemorrhoidal skin tag in the right anterior location, and a smaller skin tag in the left lateral location.  These were not inflamed on today's examination.  She has no palpable masses on digital rectal exam and has normal tone.  Musculoskeletal: Moves all extremities, no edema  Skin: no jaundice    Procedure:  Lighted anoscopy was performed in the prone jackknife position with the assistance of my medical assistant, Lamont.  This revealed moderately enlarged internal hemorrhoids.  These appeared amenable to banding.  They were not friable and did not bleed on contact with the scope.    Labs/Imaging:   No recent/pertinent labs or imaging available for review.    Assessment and Plan: Sophie Bassett is a 34 y.o. old female with enlarged internal and external hemorrhoids, as well as blood per rectum.  We discussed that the most likely source would be from hemorrhoids.  However, I cannot definitively say that the bleeding is not from a more proximal source and therefore I recommend diagnostic colonoscopy.  She is in agreement with needing a colonoscopy.  However, she is currently breast-feeding and is an under- of breastmilk.  She would prefer to wait until she is done " breast-feeding before going through with the colonoscopy.  I think that is very reasonable.  She will continue the Dulcolax, and could even consider increasing this to twice daily.  With her chronic constipation being better controlled and her pregnancies over with, it is possible that she sees adequate benefit with these first-line treatments that she may not need further intervention.  From her description, it sounds like she has also had thrombosed hemorrhoids in the past.  We discussed that if that were to occur again, to call the office and I can fit her in sooner as there is benefit within the first 72 hours of incising and draining them.  She is going to call my office once she is done breast-feeding.  At that point, we will get her scheduled for colonoscopy and reevaluate how she is doing with the Dulcolax and bowel movements.  If needed, we could then discuss either excisional hemorrhoidectomy versus hemorrhoid banding.    Taylor Hutchins MD  2/10/2025

## 2025-04-30 ENCOUNTER — APPOINTMENT (OUTPATIENT)
Age: 35
End: 2025-04-30
Payer: COMMERCIAL

## 2025-04-30 VITALS
HEART RATE: 77 BPM | DIASTOLIC BLOOD PRESSURE: 68 MMHG | HEIGHT: 67 IN | BODY MASS INDEX: 26.09 KG/M2 | WEIGHT: 166.2 LBS | SYSTOLIC BLOOD PRESSURE: 132 MMHG

## 2025-04-30 DIAGNOSIS — R53.83 OTHER FATIGUE: ICD-10-CM

## 2025-04-30 DIAGNOSIS — Z76.89 ENCOUNTER TO ESTABLISH CARE: Primary | ICD-10-CM

## 2025-04-30 DIAGNOSIS — E53.8 B12 DEFICIENCY: ICD-10-CM

## 2025-04-30 DIAGNOSIS — M25.559 HIP PAIN, UNSPECIFIED LATERALITY: ICD-10-CM

## 2025-04-30 DIAGNOSIS — R55 SYNCOPE, UNSPECIFIED SYNCOPE TYPE: ICD-10-CM

## 2025-04-30 DIAGNOSIS — Z13.29 SCREENING FOR THYROID DISORDER: ICD-10-CM

## 2025-04-30 DIAGNOSIS — D50.0 IRON DEFICIENCY ANEMIA DUE TO CHRONIC BLOOD LOSS: ICD-10-CM

## 2025-04-30 DIAGNOSIS — Z13.1 SCREENING FOR DIABETES MELLITUS (DM): ICD-10-CM

## 2025-04-30 PROBLEM — R21 RASH: Status: RESOLVED | Noted: 2023-02-04 | Resolved: 2025-04-30

## 2025-04-30 PROBLEM — B00.1 COLD SORE: Status: RESOLVED | Noted: 2023-02-04 | Resolved: 2025-04-30

## 2025-04-30 PROBLEM — E66.9 OBESITY: Status: RESOLVED | Noted: 2023-02-04 | Resolved: 2025-04-30

## 2025-04-30 PROBLEM — B37.9 YEAST INFECTION: Status: RESOLVED | Noted: 2023-02-04 | Resolved: 2025-04-30

## 2025-04-30 PROCEDURE — 3008F BODY MASS INDEX DOCD: CPT | Performed by: NURSE PRACTITIONER

## 2025-04-30 PROCEDURE — 99214 OFFICE O/P EST MOD 30 MIN: CPT | Performed by: NURSE PRACTITIONER

## 2025-04-30 PROCEDURE — 1036F TOBACCO NON-USER: CPT | Performed by: NURSE PRACTITIONER

## 2025-04-30 RX ORDER — METOCLOPRAMIDE 10 MG/1
10 TABLET ORAL
COMMUNITY
Start: 2024-05-17

## 2025-04-30 RX ORDER — PYRIDOXINE HCL (VITAMIN B6) 25 MG
25 TABLET ORAL
COMMUNITY
Start: 2024-05-13 | End: 2025-04-30 | Stop reason: ALTCHOICE

## 2025-04-30 RX ORDER — NORELGESTROMIN AND ETHINYL ESTRADIOL 35; 150 UG/D; UG/D
PATCH TRANSDERMAL
COMMUNITY
Start: 2025-04-14

## 2025-04-30 RX ORDER — LANOLIN ALCOHOL/MO/W.PET/CERES
400 CREAM (GRAM) TOPICAL
COMMUNITY
Start: 2024-05-31 | End: 2025-04-30 | Stop reason: ALTCHOICE

## 2025-04-30 ASSESSMENT — ENCOUNTER SYMPTOMS
SHORTNESS OF BREATH: 0
DYSURIA: 0
FATIGUE: 1
CHEST TIGHTNESS: 0
SLEEP DISTURBANCE: 0
VOMITING: 0
HEADACHES: 0
BLOOD IN STOOL: 0
DIARRHEA: 0
DIZZINESS: 1
LIGHT-HEADEDNESS: 0
NAUSEA: 0
COLOR CHANGE: 0
PALPITATIONS: 0
CONSTIPATION: 0
ABDOMINAL PAIN: 0
ARTHRALGIAS: 0
MYALGIAS: 0
PSYCHIATRIC NEGATIVE: 1

## 2025-04-30 NOTE — PROGRESS NOTES
"Subjective   Patient ID: Sophie Bassett is a 35 y.o. female who presents for Establish Care.    HPI   NPV  Sophie here to establish care. Has history of anxiety, anemia, B12 deficiency, migraines, syncopal episodes, heart racing. Has family history of POTS, and RA.     Hip pain/joint pain: has family history of RA. Has had negative RAD.    Has experienced heat intolerance especially when taking a shower. She will pass out. If The outside is more humid days she will also feel like she is going to \"collapse.\"   Feels like her heart races. Sometimes will be during \"panic attacks\" other times will be during nothing at all.   Severe pain has also caused a collapsing moment for her when a cyst ruptured on her ovary.   She recently had a baby (4 months ago) she experienced several episodes of syncope during her delivery.   She also has noted her blood pressure to be low on occasion at home, she will feel dizzy and she will feel \"sweaty\" on the back of her neck. This has happened every time she gets an episode. She does experience visual changes but not every time. She reports that these symptoms happen every episode. Bad episodes twice a year. Minor episodes 6-10 times a year.       Review of Systems   Constitutional:  Positive for fatigue.   HENT: Negative.     Respiratory:  Negative for chest tightness and shortness of breath.    Cardiovascular:  Negative for chest pain, palpitations and leg swelling.   Gastrointestinal:  Negative for abdominal pain, blood in stool, constipation, diarrhea, nausea and vomiting.   Genitourinary:  Negative for dysuria.   Musculoskeletal:  Negative for arthralgias and myalgias.   Skin:  Negative for color change.   Neurological:  Positive for dizziness and syncope. Negative for light-headedness and headaches.   Psychiatric/Behavioral: Negative.  Negative for self-injury, sleep disturbance and suicidal ideas.        Objective   /68   Pulse 77   Ht 1.702 m (5' 7\")   Wt 75.4 kg " (166 lb 3.2 oz)   BMI 26.03 kg/m²     Physical Exam  Vitals and nursing note reviewed.   Constitutional:       Appearance: Normal appearance.   HENT:      Head: Normocephalic and atraumatic.   Cardiovascular:      Rate and Rhythm: Normal rate and regular rhythm.      Pulses: Normal pulses.      Heart sounds: Normal heart sounds.   Pulmonary:      Effort: Pulmonary effort is normal.      Breath sounds: Normal breath sounds.   Abdominal:      General: Bowel sounds are normal.      Palpations: Abdomen is soft.   Skin:     General: Skin is warm and dry.   Neurological:      General: No focal deficit present.      Mental Status: She is alert and oriented to person, place, and time.   Psychiatric:         Mood and Affect: Mood normal.         Behavior: Behavior normal.         Thought Content: Thought content normal.         Judgment: Judgment normal.         Assessment/Plan   Problem List Items Addressed This Visit           ICD-10-CM    Anemia D64.9    Relevant Orders    CBC and Auto Differential    Vitamin B12    Iron and TIBC    Reticulocytes    Folate    Ferritin    Vitamin B6    B12 deficiency E53.8    Relevant Orders    Vitamin B12    BMI 26.0-26.9,adult Z68.26    Hip pain M25.559    Relevant Orders    Magnesium    C-reactive protein    Sedimentation Rate    Rheumatoid Factor    Other fatigue R53.83    Relevant Orders    Vitamin D 25-Hydroxy,Total (for eval of Vitamin D levels)    Tsh With Reflex To Free T4 If Abnormal    Comprehensive Metabolic Panel    Syncope R55    Relevant Orders    Referral to Neurology     Other Visit Diagnoses         Codes      Encounter to establish care    -  Primary Z76.89      Screening for thyroid disorder     Z13.29    Relevant Orders    Tsh With Reflex To Free T4 If Abnormal      Screening for diabetes mellitus (DM)     Z13.1    Relevant Orders    Hemoglobin A1C             Follow up to be determined based on lab work. We will send out referral to POTS clinic for further testing.

## 2025-07-30 LAB
25(OH)D3+25(OH)D2 SERPL-MCNC: 24 NG/ML (ref 30–100)
ALBUMIN SERPL-MCNC: 4 G/DL (ref 3.6–5.1)
ALP SERPL-CCNC: 72 U/L (ref 31–125)
ALT SERPL-CCNC: 12 U/L (ref 6–29)
ANION GAP SERPL CALCULATED.4IONS-SCNC: 8 MMOL/L (CALC) (ref 7–17)
AST SERPL-CCNC: 16 U/L (ref 10–30)
BASOPHILS # BLD AUTO: 60 CELLS/UL (ref 0–200)
BASOPHILS NFR BLD AUTO: 0.7 %
BILIRUB SERPL-MCNC: 0.3 MG/DL (ref 0.2–1.2)
BUN SERPL-MCNC: 8 MG/DL (ref 7–25)
CALCIUM SERPL-MCNC: 8.7 MG/DL (ref 8.6–10.2)
CHLORIDE SERPL-SCNC: 104 MMOL/L (ref 98–110)
CO2 SERPL-SCNC: 25 MMOL/L (ref 20–32)
CREAT SERPL-MCNC: 0.76 MG/DL (ref 0.5–0.97)
CRP SERPL-MCNC: 8 MG/L
EGFRCR SERPLBLD CKD-EPI 2021: 106 ML/MIN/1.73M2
EOSINOPHIL # BLD AUTO: 26 CELLS/UL (ref 15–500)
EOSINOPHIL NFR BLD AUTO: 0.3 %
ERYTHROCYTE [DISTWIDTH] IN BLOOD BY AUTOMATED COUNT: 12.4 % (ref 11–15)
ERYTHROCYTE [SEDIMENTATION RATE] IN BLOOD BY WESTERGREN METHOD: 9 MM/H
EST. AVERAGE GLUCOSE BLD GHB EST-MCNC: 100 MG/DL
EST. AVERAGE GLUCOSE BLD GHB EST-SCNC: 5.5 MMOL/L
FERRITIN SERPL-MCNC: 14 NG/ML (ref 16–154)
FOLATE SERPL-MCNC: 11.3 NG/ML
GLUCOSE SERPL-MCNC: 86 MG/DL (ref 65–139)
HBA1C MFR BLD: 5.1 %
HCT VFR BLD AUTO: 40 % (ref 35–45)
HGB BLD-MCNC: 13 G/DL (ref 11.7–15.5)
IRON SATN MFR SERPL: 18 % (CALC) (ref 16–45)
IRON SERPL-MCNC: 70 MCG/DL (ref 40–190)
LYMPHOCYTES # BLD AUTO: 2606 CELLS/UL (ref 850–3900)
LYMPHOCYTES NFR BLD AUTO: 30.3 %
MAGNESIUM SERPL-MCNC: 2 MG/DL (ref 1.5–2.5)
MCH RBC QN AUTO: 27.5 PG (ref 27–33)
MCHC RBC AUTO-ENTMCNC: 32.5 G/DL (ref 32–36)
MCV RBC AUTO: 84.7 FL (ref 80–100)
MONOCYTES # BLD AUTO: 439 CELLS/UL (ref 200–950)
MONOCYTES NFR BLD AUTO: 5.1 %
NEUTROPHILS # BLD AUTO: 5470 CELLS/UL (ref 1500–7800)
NEUTROPHILS NFR BLD AUTO: 63.6 %
PLATELET # BLD AUTO: 276 THOUSAND/UL (ref 140–400)
PMV BLD REES-ECKER: 11.1 FL (ref 7.5–12.5)
POTASSIUM SERPL-SCNC: 4.2 MMOL/L (ref 3.5–5.3)
PROT SERPL-MCNC: 6.6 G/DL (ref 6.1–8.1)
PYRIDOXAL PHOS SERPL-MCNC: NORMAL UG/L
RBC # BLD AUTO: 4.72 MILLION/UL (ref 3.8–5.1)
RETICS #: NORMAL CELLS/UL (ref 20000–80000)
RETICS/RBC NFR AUTO: 1.1 %
RHEUMATOID FACT SERPL-ACNC: <10 IU/ML
SODIUM SERPL-SCNC: 137 MMOL/L (ref 135–146)
TIBC SERPL-MCNC: 391 MCG/DL (CALC) (ref 250–450)
TSH SERPL-ACNC: 2.01 MIU/L
VIT B12 SERPL-MCNC: 262 PG/ML (ref 200–1100)
WBC # BLD AUTO: 8.6 THOUSAND/UL (ref 3.8–10.8)

## 2025-08-04 LAB
25(OH)D3+25(OH)D2 SERPL-MCNC: 24 NG/ML (ref 30–100)
ALBUMIN SERPL-MCNC: 4 G/DL (ref 3.6–5.1)
ALP SERPL-CCNC: 72 U/L (ref 31–125)
ALT SERPL-CCNC: 12 U/L (ref 6–29)
ANION GAP SERPL CALCULATED.4IONS-SCNC: 8 MMOL/L (CALC) (ref 7–17)
AST SERPL-CCNC: 16 U/L (ref 10–30)
BASOPHILS # BLD AUTO: 60 CELLS/UL (ref 0–200)
BASOPHILS NFR BLD AUTO: 0.7 %
BILIRUB SERPL-MCNC: 0.3 MG/DL (ref 0.2–1.2)
BUN SERPL-MCNC: 8 MG/DL (ref 7–25)
CALCIUM SERPL-MCNC: 8.7 MG/DL (ref 8.6–10.2)
CHLORIDE SERPL-SCNC: 104 MMOL/L (ref 98–110)
CO2 SERPL-SCNC: 25 MMOL/L (ref 20–32)
CREAT SERPL-MCNC: 0.76 MG/DL (ref 0.5–0.97)
CRP SERPL-MCNC: 8 MG/L
EGFRCR SERPLBLD CKD-EPI 2021: 106 ML/MIN/1.73M2
EOSINOPHIL # BLD AUTO: 26 CELLS/UL (ref 15–500)
EOSINOPHIL NFR BLD AUTO: 0.3 %
ERYTHROCYTE [DISTWIDTH] IN BLOOD BY AUTOMATED COUNT: 12.4 % (ref 11–15)
ERYTHROCYTE [SEDIMENTATION RATE] IN BLOOD BY WESTERGREN METHOD: 9 MM/H
EST. AVERAGE GLUCOSE BLD GHB EST-MCNC: 100 MG/DL
EST. AVERAGE GLUCOSE BLD GHB EST-SCNC: 5.5 MMOL/L
FERRITIN SERPL-MCNC: 14 NG/ML (ref 16–154)
FOLATE SERPL-MCNC: 11.3 NG/ML
GLUCOSE SERPL-MCNC: 86 MG/DL (ref 65–139)
HBA1C MFR BLD: 5.1 %
HCT VFR BLD AUTO: 40 % (ref 35–45)
HGB BLD-MCNC: 13 G/DL (ref 11.7–15.5)
IRON SATN MFR SERPL: 18 % (CALC) (ref 16–45)
IRON SERPL-MCNC: 70 MCG/DL (ref 40–190)
LYMPHOCYTES # BLD AUTO: 2606 CELLS/UL (ref 850–3900)
LYMPHOCYTES NFR BLD AUTO: 30.3 %
MAGNESIUM SERPL-MCNC: 2 MG/DL (ref 1.5–2.5)
MCH RBC QN AUTO: 27.5 PG (ref 27–33)
MCHC RBC AUTO-ENTMCNC: 32.5 G/DL (ref 32–36)
MCV RBC AUTO: 84.7 FL (ref 80–100)
MONOCYTES # BLD AUTO: 439 CELLS/UL (ref 200–950)
MONOCYTES NFR BLD AUTO: 5.1 %
NEUTROPHILS # BLD AUTO: 5470 CELLS/UL (ref 1500–7800)
NEUTROPHILS NFR BLD AUTO: 63.6 %
PLATELET # BLD AUTO: 276 THOUSAND/UL (ref 140–400)
PMV BLD REES-ECKER: 11.1 FL (ref 7.5–12.5)
POTASSIUM SERPL-SCNC: 4.2 MMOL/L (ref 3.5–5.3)
PROT SERPL-MCNC: 6.6 G/DL (ref 6.1–8.1)
PYRIDOXAL PHOS SERPL-MCNC: 5.1 NG/ML (ref 2.1–21.7)
RBC # BLD AUTO: 4.72 MILLION/UL (ref 3.8–5.1)
RETICS #: NORMAL CELLS/UL (ref 20000–80000)
RETICS/RBC NFR AUTO: 1.1 %
RHEUMATOID FACT SERPL-ACNC: <10 IU/ML
SODIUM SERPL-SCNC: 137 MMOL/L (ref 135–146)
TIBC SERPL-MCNC: 391 MCG/DL (CALC) (ref 250–450)
TSH SERPL-ACNC: 2.01 MIU/L
VIT B12 SERPL-MCNC: 262 PG/ML (ref 200–1100)
WBC # BLD AUTO: 8.6 THOUSAND/UL (ref 3.8–10.8)

## 2025-08-05 ENCOUNTER — OFFICE VISIT (OUTPATIENT)
Dept: URGENT CARE | Facility: CLINIC | Age: 35
End: 2025-08-05
Payer: COMMERCIAL

## 2025-08-05 VITALS
TEMPERATURE: 98.4 F | RESPIRATION RATE: 14 BRPM | SYSTOLIC BLOOD PRESSURE: 115 MMHG | HEART RATE: 78 BPM | OXYGEN SATURATION: 100 % | DIASTOLIC BLOOD PRESSURE: 81 MMHG

## 2025-08-05 DIAGNOSIS — N76.0 ACUTE VAGINITIS: Primary | ICD-10-CM

## 2025-08-05 PROCEDURE — 99213 OFFICE O/P EST LOW 20 MIN: CPT | Performed by: NURSE PRACTITIONER

## 2025-08-05 RX ORDER — FLUCONAZOLE 150 MG/1
150 TABLET ORAL
Qty: 2 TABLET | Refills: 0 | Status: SHIPPED | OUTPATIENT
Start: 2025-08-05 | End: 2025-08-09

## 2025-08-05 NOTE — PROGRESS NOTES
[facility]   EMILY NOTE:      Name: Sophie Bassett, 35 y.o.    CSN:[id number]   MRN:[id number]      ALL:  Allergies[1]      Chief Complaint: Vaginal Itching (X 2 days)    Encounter Date: 8/5/2025      HPI: The history was obtained from the patient. Sophie is a 35 y.o. female, who presents with a chief complaint of vaginal itching without discharge that began approximately 2 days ago.  She is currently on her menstrual cycle and unable to determine if there is vaginal discharge.  Reports previous yeast infection in the past which states feels similar.  Does have an appointment scheduled with her OB/GYN next week for tubal ligation.  Denies any new sexual partners, change in personal hygiene material, pelvic discomfort, urinary symptoms, fever, chills.    PMHx:    Medical History[2]        Current Medications[3]      PMSx:  Surgical History[4]    Fam Hx: Family History[5]    SOC. Hx:     Social History     Socioeconomic History    Marital status:      Spouse name: Not on file    Number of children: Not on file    Years of education: Not on file    Highest education level: Not on file   Occupational History    Not on file   Tobacco Use    Smoking status: Never    Smokeless tobacco: Never   Vaping Use    Vaping status: Every Day    Substances: Nicotine   Substance and Sexual Activity    Alcohol use: Not Currently    Drug use: Never    Sexual activity: Defer   Other Topics Concern    Not on file   Social History Narrative    Not on file     Social Drivers of Health     Financial Resource Strain: Not on file   Food Insecurity: No Food Insecurity (12/20/2024)    Received from [facility]    Hunger Vital Sign     Within the past 12 months, you worried that your food would run out before you got the money to buy more.: Never true     Within the past 12 months, the food you bought just didn't last and you didn't have money to get more.: Never true   Transportation Needs: No Transportation Needs  (12/20/2024)    Received from [facility]    PRAPARE - Transportation     Lack of Transportation (Medical): No     Lack of Transportation (Non-Medical): No   Physical Activity: Not on file   Stress: Not on file   Social Connections: Not on file   Intimate Partner Violence: Not on file   Housing Stability: Low Risk  (12/20/2024)    Received from [facility]    Housing Stability Vital Sign     In the last 12 months, was there a time when you were not able to pay the mortgage or rent on time?: No     In the past 12 months, how many times have you moved where you were living?: 1     At any time in the past 12 months, were you homeless or living in a shelter (including now)?: No         Vitals:    08/05/25 1609   BP: 115/81   Pulse: 78   Resp: 14   Temp: 36.9 °C (98.4 °F)   SpO2: 100%                Physical Exam  Vitals and nursing note reviewed.   Constitutional:       Appearance: Normal appearance. She is not ill-appearing.   HENT:      Head: Normocephalic and atraumatic.      Mouth/Throat:      Mouth: Mucous membranes are moist.     Cardiovascular:      Rate and Rhythm: Normal rate and regular rhythm.      Heart sounds: Normal heart sounds.   Pulmonary:      Breath sounds: Normal breath sounds.   Abdominal:      General: Bowel sounds are normal.   Genitourinary:     Comments: Exam deferred    Musculoskeletal:         General: Normal range of motion.      Cervical back: Normal range of motion.     Skin:     General: Skin is dry.      Capillary Refill: Capillary refill takes less than 2 seconds.     Neurological:      Mental Status: She is alert and oriented to person, place, and time.           ____________________________________________________________________    I did personally review Sophie's past medical history, surgical history, social history, as well as family history (when relevant).  In this case, I also oversaw the her drug management by reviewing her medication list, allergy list, as well as the  medications that I prescribed during the UC course and/or recommended as an out-patient (including possible OTC medications such as acetaminophen, NSAIDs , etc).    After reviewing the items above, I did look at previous medical documentation, such as recent hospitalizations, office visits, and/or recent consultations with PCP/specialist.                          SDOH:   Another factor that I considered in Sophie's care was her Social Determinants of Health (SDOH). During this UC encounter, she did not have social determinants of health. Those SDOH influencing Johans care are: none      _____________________________________________________________________      UC COURSE/MEDICAL DECISION MAKING:    Sophie is a 35 y.o., who presents with a working diagnosis of   1. Acute vaginitis       Plan:   Diflucan 1 tab today then in 72 hours if symptoms have not completely resolved.  Follow-up with OB/GYN as scheduled if symptoms do not improve  Return to urgent care, primary care provider, or emergency department with worsening symptoms      No red flags on exam. Plan of care reviewed with patient, agreeable to discharge      MIC Sanchez DNP   Advanced Practice Provider  [facility]    Please note: While the patient may or may not have received printed discharge paperwork, all relevant medical findings, test results, and treatment details are accessible through the electronic medical record system. The patient is encouraged to review their chart via the patient portal for comprehensive information and follow-up instructions.       [1]   Allergies  Allergen Reactions    Ciprofloxacin Other     THROAT SWELLING   [2]   Past Medical History:  Diagnosis Date    Anxiety     Encounter for gynecological examination (general) (routine) without abnormal findings     Pap test, as part of routine gynecological examination    Other conditions influencing health status     History of vaginal delivery    Other  conditions influencing health status     Menarche   [3]   Current Outpatient Medications   Medication Sig Dispense Refill    rimegepant (Nurtec ODT) 75 mg tablet,disintegrating Dissolve 1 tablet (75 mg) in the mouth if needed (Not to exceed 2 in 24 hours).      rizatriptan (Maxalt) 10 mg tablet Take at onset of headache. May repeat every 2 hours as needed. Maximum 3 tablets/ 24 hours. 9 tablet 3    fluconazole (Diflucan) 150 mg tablet Take 1 tablet (150 mg) by mouth every 3rd day for 2 doses. 2 tablet 0    phentermine (Adipex-P) 37.5 mg tablet Take 1 tablet (37.5 mg) by mouth once daily in the morning. Take before meals. 30 tablet 0     No current facility-administered medications for this visit.   [4]   Past Surgical History:  Procedure Laterality Date     SECTION, CLASSIC  2024    CHOLECYSTECTOMY  2020   [5]   Family History  Problem Relation Name Age of Onset    Arthritis Mother      Depression Mother      Hypertension Mother      Other (Diabetes mellitus) Father      Kidney disease Father          RENAL FAILURE    Heart disease Father      Heart murmur Sister      Hypertension Brother      Breast cancer Maternal Grandmother      Ovarian cancer Maternal Grandmother      Pancreatic cancer Paternal Grandmother      Colon cancer Paternal Grandfather      Arthritis Other Grandparent     Heart failure Other Grandparent     Depression Other Grandparent     Other (Diabetes mellitus) Other Grandparent     Lung cancer Other Grandparent     Lupus Other Grandparent

## 2025-08-21 DIAGNOSIS — G43.709 CHRONIC MIGRAINE WITHOUT AURA WITHOUT STATUS MIGRAINOSUS, NOT INTRACTABLE: ICD-10-CM

## 2025-08-21 RX ORDER — RIZATRIPTAN BENZOATE 10 MG/1
TABLET ORAL
Qty: 9 TABLET | Refills: 1 | Status: SHIPPED | OUTPATIENT
Start: 2025-08-21